# Patient Record
(demographics unavailable — no encounter records)

---

## 2021-07-22 NOTE — CONSULTATION
History of Present Illness





- Reason for Consult


Consult date: 07/22/21


Reason for consult: possible OD





- History of Present Psychiatric Illness


Per ER Note: 40-year-old male, history of schizophrenia, presents to ED with 

complaint of abdominal pain.  Patient states, " I'm an alcoholic and I 

overdosed."  Patient states he took 10 pills of his olanzapine yesterday at 

around 5 PM.  He endorses SI and reports auditory hallucinations telling him to 

kill himself.





Imer Henry is a 41y/o male patient whom I evaluated today for possible 

overdose. During my evaluation of the patient today, he is calm and cooperative.

He is a/ o x 2. He says he overdosed on his medications. When asking the patient

if he was trying to kill himself or do self harm, he says "no, I was only trying

to get high." I asked him if he's feeling suicidal now at all. He replies "no 

I'm not suicidal now and yesterday either. I was trying to get high." The 

patient denies homicidal thoughts. He also denies hallucinations of any kind. He

denies any illicit drug use outside of THC. He says "I feel alright" when asked.







PAST PSYCHIATRIC HISTORY: 


Diagnoses: Schizophrenia


Suicide attempts or Self-harm behavior: Denies


Prior psychiatric hospitalizations: Yes


Substance Abuse history: THC


Previous psychiatric medications tried: Olanzapine


Outpatient treatment: Yes





PAST MEDICAL HISTORY: None reported or document





Family Psychiatric History: None reported or documented





SOCIAL HISTORY


Marital Status: 


Living Arrangements: "with uncle them"


Employment Status: Disabled


Access to guns/weapons: denies


Education: 


History of Abuse: denies


Legal History: denies





REVIEW OF SYSTEMS


Constitutional: Negative for weight loss


ENT: Negative for stridor


Respiratory: Negative for cough or hemoptysis


All other systems reviewed and are negative


 


MENTAL STATUS EXAMINATION


General Appearance and Behavior: Age appropriate, wearing appropriate clothes, 

cooperative polite with questioning, good eye contact, cooperative


Cooperation: cooperative


Psychomotor Behavior: Psychomotor normal


Mood: "I feel alright"


Affect and affective range: congruent with stated mood


Thought Process: goal directed


Thought Content: None


Speech: Normal volume, Regular rate and rhythm 


Suicidal Ideation: Denies


Homicidal Ideation: Denies


hallucination: Denies


Delusions: None elicited


Impulse Control: Limited


Insight and Judgment: Limited


Memory:  Intact


Attention: Divided


Orientation: Alert and oriented





Diagnoses: 


Hx of Schizophrenia





Treatment Plan


No scripts given at this time


Sitter: Per primary


Medical: Per primary


Disposition: Do not recommend acute psychiatric inpatient treatment


 to give appropriate outpatient resources


 to also further discuss and document safety plan


The patient to follow up with outpatient psych in 7 to 14 days upon discharge


Will sign off. Thanks


Case staffed with Dr. Mason.








Medications and Allergies


                                    Allergies











Allergy/AdvReac Type Severity Reaction Status Date / Time


 


No Known Allergies Allergy   Verified 02/12/18 12:11














Mental Status Exam





- Vital signs


                                Last Vital Signs











Temp  98.6 F   07/22/21 09:17


 


Pulse  76   07/22/21 09:17


 


Resp  20   07/22/21 09:17


 


BP  135/92   07/22/21 09:17


 


Pulse Ox  100   07/22/21 09:17














Results


Result Diagrams: 


                                 07/22/21 04:31





                                 07/22/21 04:31


                              Abnormal lab results











  07/22/21 07/22/21 07/22/21 Range/Units





  04:31 05:49 05:49 


 


Plt Count  471 H    (140-440)  K/mm3


 


Seg Neutrophils %  73.0 H    (40.0-70.0)  %


 


Urine WBC (Auto)     (0.0-6.0)  /HPF


 


Salicylates   < 0.3 L   (2.8-20.0)  mg/dL


 


Acetaminophen    5.0 L  (10.0-30.0)  ug/mL














  07/22/21 Range/Units





  Unknown 


 


Plt Count   (140-440)  K/mm3


 


Seg Neutrophils %   (40.0-70.0)  %


 


Urine WBC (Auto)  8.0 H  (0.0-6.0)  /HPF


 


Salicylates   (2.8-20.0)  mg/dL


 


Acetaminophen   (10.0-30.0)  ug/mL








All other labs normal.

## 2021-07-22 NOTE — EMERGENCY DEPARTMENT REPORT
ED Psych HPI





- General


Chief Complaint: Abdominal Pain


Stated Complaint: ABD PAIN


Time Seen by Provider: 21 06:32


Source: patient


Mode of arrival: Ambulatory





- History of Present Illness


Initial Comments: 





40-year-old male, history of schizophrenia, presents to ED with complaint of 

abdominal pain.  Patient states, " I'm an alcoholic and I overdosed."  Patient 

states he took 10 pills of his olanzapine yesterday at around 5 PM.  He endorses

SI and reports auditory hallucinations telling him to kill himself.


MD Complaint: suicidal ideation


-: unknown


Associated Psychiatric Symptoms: suicidal ideation, auditory hallucinations


Quality: constant


Improves With: none


Worsens With: none


Associated Symptoms: other (Abdominal pain)


Treatments Prior to Arrival: none


If Self Harm: intentional overdose


Details of Plan: Patient reports he took 10 pills of olanzapine





- Related Data


                                    Allergies











Allergy/AdvReac Type Severity Reaction Status Date / Time


 


No Known Allergies Allergy   Verified 18 12:11














ED Review of Systems


ROS: 


Stated complaint: ABD PAIN


Other details as noted in HPI





Comment: All other systems reviewed and negative


Gastrointestinal: abdominal pain.  denies: nausea, vomiting


Psychiatric: auditory hallucinations, suicidal thoughts





ED Past Medical Hx





- Past Medical History


Previous Medical History?: Yes


Hx Psychiatric Treatment: Yes (Schizophrenia)





- Social History


Smoking Status: Never Smoker





ED Physical Exam





- General


Limitations: No Limitations


General appearance: alert, in no apparent distress





- Head


Head exam: Present: atraumatic, normocephalic





- Eye


Eye exam: Present: normal appearance, EOMI





- ENT


ENT exam: Present: mucous membranes moist





- Neck


Neck exam: Present: normal inspection





- Respiratory


Respiratory exam: Present: normal lung sounds bilaterally.  Absent: respiratory 

distress





- Cardiovascular


Cardiovascular Exam: Present: regular rate, normal rhythm





- GI/Abdominal


GI/Abdominal exam: Present: soft.  Absent: distended, tenderness





- Extremities Exam


Extremities exam: Present: normal inspection





- Neurological Exam


Neurological exam: Present: alert, oriented X3





- Psychiatric


Psychiatric exam: Present: suicidal ideation





- Skin


Skin exam: Present: warm, dry, intact, normal color





ED Course


                                   Vital Signs











  21





  04:21 05:31 09:17


 


Temperature 97.7 F  98.6 F


 


Pulse Rate 83  76


 


Respiratory 18 20 20





Rate   


 


Blood Pressure 121/75  


 


Blood Pressure   135/92





[Right]   


 


O2 Sat by Pulse 100 98 100





Oximetry   














- Reevaluation(s)


Reevaluation #1: 





21 07:32


Poison control was contacted by nurse.  They recommend 6 to 8-hour observation 

following ingestion.  We are currently more than 12 hours out from patient's 

ingestion time.  He is awake and alert, oriented x3.  Patient is medically 

clear.


Reevaluation #2: 





21 11:48


Patient has been seen, evaluated, and cleared by psychiatry team.  No inpatient 

admission recommended at this time.  Outpatient resources given.





ED Medical Decision Making





- Lab Data


Result diagrams: 


                                 21 04:31





                                 21 04:31





- Medical Decision Making





40-year-old male, history of schizophrenia, presents to ED reporting an overdose

 on olanzapine 5 PM on yesterday.  Patient is A&O x3.  Vital signs are stable.  

Labs are unremarkable.  Poison control has been called regarding his possible 

overdose.  They recommend 6 to 8-hour observation following ingestion.  Patient 

is now more than 12 hours from his supposed ingestion.  He is medically clear 

for mental health evaluation.  Will dispo per psych.


Critical care attestation.: 


If time is entered above; I have spent that time in minutes in the direct care 

of this critically ill patient, excluding procedure time.








ED Disposition


Clinical Impression: 


 Schizophrenia





Disposition: DC-01 TO HOME OR SELFCARE


Is pt being admited?: No


Condition: Stable


Instructions:  Managing Schizophrenia


Additional Instructions: 


                       OUTPATIENT MENTAL HEALTH RESOURCES





[facility]         Mireille Dennis MD:


[address],                [address]


[place] [address]               [place] [address]


 [phone]





[facility]:              [address]               [address]


[place] [address]            [place] [address]


[phone]





[facility]:      [facility]: 


[address]  [address]      [address] [place] [address] [address]


[place] [address]               [facility] [id number] [phone]





[facility]:     Aristides Juarez MD:


[address]                 [address] [id number] [phone]





[facility]:   


[address]


[place], [place] [id number] [phone]


                  **[place] CRISIS [place] ACCESS LINE: [phone]**





SUBSTANCE ABUSE PROGRAMS:





[facility]:


Location: [place], [place]


Phone: [phone]


[facility]


Address: [address] [place] [address]


Phone: [phone]





[facility]:





Address: [address] [place] [address]


Phone: [phone]





[facility]:


 


Address: [address] [place] [address]


Phone: [phone]








[facility]:


Address: [address] [place] [address]


Phone: [phone]





[facility]





Address: [address] [place] [address].


Phone: [phone]


Please contact above numbers to attempt placement into free based program.


Medicaid Programs: 





[facility]:


Address: [address] [place] [address]


Phone: [phone]





[facility]:


Address: [address] [place] [address]


Phone: [phone]


Referrals: 


PRIMARY CARE,MD [Primary Care Provider] - 3-5 Days


Time of Disposition: 11:48